# Patient Record
(demographics unavailable — no encounter records)

---

## 2024-12-04 NOTE — ASSESSMENT
[FreeTextEntry1] : #NUBS, R upper chest favor EIC -- excision surgery performed 12/04/2024 -- primary intermediate linear repair performed -- f/u for wound check 2 weeks

## 2024-12-04 NOTE — PHYSICAL EXAM
[Alert] : alert [Oriented x 3] : ~L oriented x 3 [Well Nourished] : well nourished [Conjunctiva Non-injected] : conjunctiva non-injected [No Visual Lymphadenopathy] : no visual  lymphadenopathy [No Clubbing] : no clubbing [No Edema] : no edema [No Bromhidrosis] : no bromhidrosis [No Chromhidrosis] : no chromhidrosis [FreeTextEntry3] : R upper chest with 1.5 cm firm mobile subcu nodule with visible overlying punctum

## 2024-12-04 NOTE — HISTORY OF PRESENT ILLNESS
[FreeTextEntry1] : excision of cyst on R upper chest [de-identified] : Mr. KRYSTLE FAY  is a 51 year old M here for evaluation of below  #cyst on R upper chest x yrs - here for excision today Pertinent positives noted below  History of HIV or hepatitis: No Blood thinners: none Antibiotic Prophylaxis: None  Medical implants: None  The patient's review of systems questionnaire was reviewed. Education needs were identified. There were no barriers to learning.  derm hx:  #dry skin x 3-4yrs - untreated - showers twice daily S dove regular M aloe vera D reg  no personal or family h/o skin cancer Social Hx: works in [employer], visually impaired, has a guidedog

## 2024-12-04 NOTE — CONSULT LETTER
[Dear  ___] : Dear  [unfilled], [Consult Letter:] : I had the pleasure of evaluating your patient, [unfilled]. [Please see my note below.] : Please see my note below. [Consult Closing:] : Thank you very much for allowing me to participate in the care of this patient.  If you have any questions, please do not hesitate to contact me. [Sincerely,] : Sincerely, [FreeTextEntry3] : Olinda Caldwell MD Department of Dermatology [place] and Jayne WADDELL at [facility]

## 2024-12-18 NOTE — HISTORY OF PRESENT ILLNESS
[FreeTextEntry1] : excision of cyst on R upper chest [de-identified] : Mr. KRYSTLE FAY  is a 52 year old M here for evaluation of below  #cyst on R upper chest x yrs - here for wound check s/p  excision 12/4/2024 #itching on R upper back occasionally #spots on body x yrs Pertinent positives noted below  History of HIV or hepatitis: No Blood thinners: none Antibiotic Prophylaxis: None  Medical implants: None  The patient's review of systems questionnaire was reviewed. Education needs were identified. There were no barriers to learning.  derm hx:  #dry skin x 3-4yrs - untreated - showers twice daily S dove regular M aloe vera D reg  no personal or family h/o skin cancer Social Hx: works in [employer], visually impaired, has a guidedog

## 2024-12-18 NOTE — COUNSELING
[de-identified] :    Recommendations to Avoid Gout flare.   Minimize unnecessary surgical procedures and manage trauma effectively to reduce the risk of gout flare-ups. Prevent Volume Depletion: Stay well-hydrated to avoid dehydration, especially during hospitalization or after significant trauma. Limit intake of high-protein foods (e.g., red meat, seafood). Reduce consumption of high-fat foods and sweetened beverages (e.g., those containing fructose). Avoid heavy alcohol use, particularly beer and spirits, which can increase gout risk. Address and control risk factors such as hypertension, obesity, chronic kidney disease, and any conditions requiring organ transplants. Incorporate dairy products into your diet to potentially lower gout risk. Drink coffee if tolerated, as it may be associated with a reduced risk of gout.

## 2024-12-18 NOTE — HISTORY OF PRESENT ILLNESS
[FreeTextEntry1] : f/u [de-identified] : A 51-year-old male with a history of prediabetes is here today for a follow-up on  obesity management The patient has a history of gout. The patient states he is being seen in podiatry and started before on colchicine.  Denies fever.  Palpitation, shortness of breath

## 2024-12-18 NOTE — HISTORY OF PRESENT ILLNESS
[FreeTextEntry1] : excision of cyst on R upper chest [de-identified] : Mr. KRYSTLE FAY  is a 52 year old M here for evaluation of below  #cyst on R upper chest x yrs - here for wound check s/p  excision 12/4/2024 #itching on R upper back occasionally #spots on body x yrs Pertinent positives noted below  History of HIV or hepatitis: No Blood thinners: none Antibiotic Prophylaxis: None  Medical implants: None  The patient's review of systems questionnaire was reviewed. Education needs were identified. There were no barriers to learning.  derm hx:  #dry skin x 3-4yrs - untreated - showers twice daily S dove regular M aloe vera D reg  no personal or family h/o skin cancer Social Hx: works in [employer], visually impaired, has a guidedog

## 2024-12-18 NOTE — ASSESSMENT
[FreeTextEntry1] : Diet Followed at Home: Patient doesn't drink milk, drinks tea    Breakfast: Cereal: Cornflakes, boiled eggs, English Omelet,  and American Bread   Lunch: Chicken Mcconnell or baked chicken, lentil mcconnell w/rice   Dinner: Similar to lunch- veggies (caulilfower, cabbage)   Snacks: Not a . Likes Sugar Free Cocoa w/cinnamon      Recent Weight Change: stable at current weight.  Exercise includes Pushups, situps, stretches. Running in one plate.    Nutrition Related Issues / Problems: Prediabetes Hyperlipidemia    Goals: reviewed therapeutic goals with patient. Diabetes Portion Plate Increase Activity- weight training and 30 min of cardio activity a day.      Nutrition Education / Counseling: The patient was educated on weight loss, fat/cholesterol restricted, portion control and high fiber.    Comprehension: patient asked/verbalized appropriate questions and concerns, patient has good understanding.    Anticipated Compliance: good  Discussed healthy lifestyle, discussed and updated vaccinations, healthy diet, exercise, chk labs, discussed appropriate screening tests including a colonoscopy.  Discussed the importance and benefit of a healthy lifestyle, including a heart-healthy diet such as the Mediterranean diet, regular exercise, and 7 hours of sleep nightly.  Total time 30 min ( 20 min. FTF and 10 min chart review and documentation.)

## 2024-12-18 NOTE — COUNSELING
[de-identified] :    Recommendations to Avoid Gout flare.   Minimize unnecessary surgical procedures and manage trauma effectively to reduce the risk of gout flare-ups. Prevent Volume Depletion: Stay well-hydrated to avoid dehydration, especially during hospitalization or after significant trauma. Limit intake of high-protein foods (e.g., red meat, seafood). Reduce consumption of high-fat foods and sweetened beverages (e.g., those containing fructose). Avoid heavy alcohol use, particularly beer and spirits, which can increase gout risk. Address and control risk factors such as hypertension, obesity, chronic kidney disease, and any conditions requiring organ transplants. Incorporate dairy products into your diet to potentially lower gout risk. Drink coffee if tolerated, as it may be associated with a reduced risk of gout.

## 2024-12-18 NOTE — ASSESSMENT
[FreeTextEntry1] : #epidermoid cyst s/p excision on 12/4 healing well, sutures removed today reviewed diagnosis  #nevi - Counseled about clinically and dermatoscopically benign lesions - No treatment indicated at this time - Counseled patient to notify us of any changes  #localized pruritus, notalgia paresthetica start OTC sarna, store in fridge for added cooling effect, apply as needed  - TBSE performed today - Advised sun protection, broad spectrum SPF 30 or higher daily and reapply often, sun protective clothing - Counseled patient to monitor for changes - rtc q1yr for repeat skin exam or sooner if new/concerning lesion

## 2024-12-18 NOTE — HISTORY OF PRESENT ILLNESS
[FreeTextEntry1] : f/u [de-identified] : A 51-year-old male with a history of prediabetes is here today for a follow-up on  obesity management The patient has a history of gout. The patient states he is being seen in podiatry and started before on colchicine.  Denies fever.  Palpitation, shortness of breath

## 2024-12-18 NOTE — PHYSICAL EXAM
[Alert] : alert [Oriented x 3] : ~L oriented x 3 [Well Nourished] : well nourished [Conjunctiva Non-injected] : conjunctiva non-injected [No Visual Lymphadenopathy] : no visual  lymphadenopathy [No Clubbing] : no clubbing [No Edema] : no edema [No Bromhidrosis] : no bromhidrosis [No Chromhidrosis] : no chromhidrosis [FreeTextEntry3] : The patient is well-appearing, in no acute distress, alert and oriented x 3. Mood and affect are normal. A complete cutaneous examination of the scalp, face, neck, chest, abdomen, back, bilateral arms, bilateral legs, finger digits, nails, eyelids, conjunctiva and lips reveals the following significant findings:  few nevi scattered on truk, arms back clear with no rash  R upper chest with linear pink scar , healing well, sutures in place

## 2024-12-24 NOTE — PHYSICAL EXAM
[No Acute Distress] : no acute distress [Well Nourished] : well nourished [Well Developed] : well developed [Well-Appearing] : well-appearing [Normal Outer Ear/Nose] : the outer ears and nose were normal in appearance [Normal Oropharynx] : the oropharynx was normal [No JVD] : no jugular venous distention [No Lymphadenopathy] : no lymphadenopathy [Supple] : supple [Thyroid Normal, No Nodules] : the thyroid was normal and there were no nodules present [No Respiratory Distress] : no respiratory distress  [No Accessory Muscle Use] : no accessory muscle use [Clear to Auscultation] : lungs were clear to auscultation bilaterally [Normal Rate] : normal rate  [Regular Rhythm] : with a regular rhythm [Normal S1, S2] : normal S1 and S2 [No Murmur] : no murmur heard [No Carotid Bruits] : no carotid bruits [No Abdominal Bruit] : a ~M bruit was not heard ~T in the abdomen [No Varicosities] : no varicosities [Pedal Pulses Present] : the pedal pulses are present [No Edema] : there was no peripheral edema [No Palpable Aorta] : no palpable aorta [No Extremity Clubbing/Cyanosis] : no extremity clubbing/cyanosis [Soft] : abdomen soft [Non Tender] : non-tender [Non-distended] : non-distended [No Masses] : no abdominal mass palpated [No HSM] : no HSM [Normal Bowel Sounds] : normal bowel sounds [Normal Posterior Cervical Nodes] : no posterior cervical lymphadenopathy [Normal Anterior Cervical Nodes] : no anterior cervical lymphadenopathy [No CVA Tenderness] : no CVA  tenderness [No Spinal Tenderness] : no spinal tenderness [No Joint Swelling] : no joint swelling [Grossly Normal Strength/Tone] : grossly normal strength/tone [No Rash] : no rash [Coordination Grossly Intact] : coordination grossly intact [No Focal Deficits] : no focal deficits [Normal Gait] : normal gait [Deep Tendon Reflexes (DTR)] : deep tendon reflexes were 2+ and symmetric [Normal Affect] : the affect was normal [Normal Insight/Judgement] : insight and judgment were intact [de-identified] : corneal scarring, blind [de-identified] : neg

## 2024-12-24 NOTE — HISTORY OF PRESENT ILLNESS
[FreeTextEntry1] : selma [de-identified] : 51 y/o Male here today for Annual. Patient has no other medical complaints. Doing well. Appetite, sleep,bms,voiding, mood all ok.  Interim and relevant labs, imaging and consultations reviewed.  referred colonoscopy given. schedule for 1/8/24.  Refused Shingrix vaccine.

## 2024-12-24 NOTE — COUNSELING
[Fall prevention counseling provided] : Fall prevention counseling provided [Adequate lighting] : Adequate lighting [No throw rugs] : No throw rugs [Use proper foot wear] : Use proper foot wear [Use recommended devices] : Use recommended devices [Behavioral health counseling provided] : Behavioral health counseling provided [Sleep ___ hours/day] : Sleep [unfilled] hours/day [Engage in a relaxing activity] : Engage in a relaxing activity [Plan in advance] : Plan in advance [Potential consequences of obesity discussed] : Potential consequences of obesity discussed [Benefits of weight loss discussed] : Benefits of weight loss discussed [Structured Weight Management Program suggested:] : Structured weight management program suggested [Encouraged to maintain food diary] : Encouraged to maintain food diary [Encouraged to increase physical activity] : Encouraged to increase physical activity [Encouraged to use exercise tracking device] : Encouraged to use exercise tracking device [Target Wt Loss Goal ___] : Weight Loss Goals: Target weight loss goal [unfilled] lbs [Weigh Self Weekly] : weigh self weekly [Decrease Portions] : decrease portions [____ min/wk Activity] : [unfilled] min/wk activity [Keep Food Diary] : keep food diary [None] : None [Good understanding] : Patient has a good understanding of lifestyle changes and steps needed to achieve self management goal [FreeTextEntry4] : 15 [de-identified] : Total face-to-face time with patient - 30 minutes; >50% involved counselling, review of labs/tests, and/or coordination of medical care: Medical Annual wellness visit completed: HRA completed and reviewed with patient Medical, family, surgical history reviewed with patient and updated List of current providers r/w patient and updated Vitals, BMI reviewed and discussed along with healthy BMI goals. Dietary counseling x 15 minutes provided Depression PHQ 9 completed and reviewed  Annual safety assessment reviewed discussed advanced directives smoking cessation counseling provided Established routine screening and immunization schedules VACCINATION & OTHER TX RECOMMENDATIONS  ASA preventative therapy Calcium/Vitamin D supplementation   Dietary counseling, nutrition referral risks vs. benefits d/w patient. routine vaccination and vaccination schedules and recommendation d/w patient  Vaccines recommended:  * pneumovax (once after 65) & prevnar * annual Influenza vaccine * Hep B vaccines * zostavax * Tdap  Colorectal screening recommended; screening colonoscopy q10yr, flex sig q5yr, annual fecal occult testing BMD recommended biennially for osteoporosis screening Glaucoma screening recommended, annual optho evals Cardiovascular screening and blood tests recommended and discussed w/ patient, cholesterol screening and dietary counseling AAA recommended x 1  Met with TAM PIZANO, who was willing to discuss advance care planning.  Our advance care planning conversation included a discussion about: 1. The value and importance of advance care planning. 2. Experiences with loved ones who have been seriously ill or have . 3. Exploration of personal, cultural, or spiritual beliefs that might influence medical decisions. 4. Exploration of goals of care in the event of a sudden injury or illness. 5. Identification of a health care agent. 6. Review and update, or completion of, an advance directive. Start time: 1030 End time: 1045  diet and exercise weight loss.  Low-salt low-fat ADA diet/ htn- Discussed diabetes physiology - Discussed importance of monitoring blood glucose levels - Encouraged a low fat/low cholesterol diet - Discussed symptoms of hyperglycemia and hypoglycemia - Discussed ADA glucose goals - Discussed  HGB A1c and the effects of blood glucose on the level - Discussed Healthy eating, avoidance of concentrated sweets, and to include vegetables by at least 2 meals a day - Discussed regular exercise - Discussed importance of follow up physician visits Limit intake of Sodium (Salt) to less than 2 grams a day to prevent fluid retention-swelling or worsening of symptoms. The importance of keeping the blood pressure at or below 130/80 to prevent stroke, heart attacks, kidney failure, blindness, and loss of limbs was  low chol diet. Avoid fried foods, red meat, butter, eggs, hard cheeses. Use canola or olive oil preferred. ::  was established in which goals would be set, monitoring would be done, and problem solving would also be addressed. The patient would be assisted using behavior change techniques, such as self-help and counseling through behavioral modification: Problem solving using hypnosis and positive medical reinforcement to achieve agreed-upon goals. Symptomatic patients : Test for influenza, if positive, treat for influenza and do not continue below.  1. Fever plus cough or shortness of breath : Test for RVP and COVID-19. 2.Indirect, circumstantial or unclear exposure to COVID-19, or other concerning cases not meeting above criteria: Please call [facility] to discuss testing.   +++ All above cases must be reported to the [facility] registry. +++  Asymptomatic patients:  1. Known first-degree direct-contact exposure to positive COVID-19 patient but asymptomatic: No testing PLUS 14 day self-quarantine. Pt to call if symptoms develop. Report to [facility] Registry. 2. No known exposure and asymptomatic, referred from outside healthcare organization: Please call [facility] to discuss testing.  3.All other asymptomatic patients with no known exposures: no testing, no exceptions.

## 2024-12-24 NOTE — HEALTH RISK ASSESSMENT
[Good] : ~his/her~  mood as  good [No] : In the past 12 months have you used drugs other than those required for medical reasons? No [No falls in past year] : Patient reported no falls in the past year [0] : 2) Feeling down, depressed, or hopeless: Not at all (0) [PHQ-2 Negative - No further assessment needed] : PHQ-2 Negative - No further assessment needed [Never] : Never [None] : None [With Family] : lives with family [Employed] : employed [] :  [Fully functional (bathing, dressing, toileting, transferring, walking, feeding)] : Fully functional (bathing, dressing, toileting, transferring, walking, feeding) [Fully functional (using the telephone, shopping, preparing meals, housekeeping, doing laundry, using] : Fully functional and needs no help or supervision to perform IADLs (using the telephone, shopping, preparing meals, housekeeping, doing laundry, using transportation, managing medications and managing finances) [Time Spent: ___ Minutes] : I spent [unfilled] minutes performing a depression screening for this patient. [HIV test declined] : HIV test declined [Hepatitis C test declined] : Hepatitis C test declined [Smoke Detector] : smoke detector [Safety elements used in home] : safety elements used in home [Seat Belt] :  uses seat belt [With Patient/Caregiver] : , with patient/caregiver [Designated Healthcare Proxy] : Designated healthcare proxy [Name: ___] : Health Care Proxy's Name: [unfilled]  [Relationship: ___] : Relationship: [unfilled] [Aggressive treatment] : aggressive treatment [I will adhere to the patient's wishes.] : I will adhere to the patient's wishes. [Time Spent: ___ minutes] : Time Spent: [unfilled] minutes [Audit-CScore] : 0 [de-identified] : walks [de-identified] : healthy [[id number]] : 0 [Change in mental status noted] : No change in mental status noted [Language] : denies difficulty with language [Behavior] : denies difficulty with behavior [Handling Complex Tasks] : denies difficulty handling complex tasks [Reasoning] : denies difficulty with reasoning [Reports changes in hearing] : Reports no changes in hearing [Reports changes in dental health] : Reports no changes in dental health [Travel to Developing Areas] : does not  travel to developing areas [TB Exposure] : is not being exposed to tuberculosis [ColonoscopyComments] : scheduled for 1/2025 [de-identified] : blind [AdvancecareDate] : 12/24 [FreeTextEntry4] : [id number]   Met with KRYSTLE FAY, who was willing to discuss advance care planning.  Our advance care planning conversation included a discussion about:  1. The value and importance of advance care planning.  2. Experiences with loved ones who have been seriously ill or have .  3. Exploration of personal, cultural, or spiritual beliefs that might influence medical decisions.  4. Exploration of goals of care in the event of a sudden injury or illness.  5. Identification of a health care agent.  6. Review and update, or completion of, an advance directive.  Start time: ____________                    End time: ____________

## 2024-12-24 NOTE — ASSESSMENT
[FreeTextEntry1] : Prediabetes- d/e, chk labs Blind- stable.  Gout- off meds, watch diet, fluids, chk uric acid healthy lifestyle, d/e, chk labs, reviewed immunizations and prev. health measures, chk psa, rec. colonoscopy Discussed the importance of screening for colon cancer. Reviewed screening reccomendations including colonoscopy, Cologuard and Fit DNA testing. I strongly encouraged colonoscopy as that is the best screening test to detect both colon cancer and polyps and is the gold standard. Discussed the importance and benefit of a healthy lifestyle including a heart healthy diet such as the Mediterranean diet and regular exercise as well as 7 hours of sleep nightly.  Obesity Set Calorie Goal: Establish a daily calorie limit tailored to achieve a gradual weight loss of 1-2 pounds per week. Prioritize Balanced Meals: Include vegetables (50%), lean protein (25%), and whole grains (25%) in every meal. Increase Fiber Intake: To improve satiety, aim for 25-30g of fiber daily from sources like fruits, vegetables, and whole grains. Hydration First: Drink at least 2-3 liters of water daily, especially before meals, to manage hunger. Limit Added Sugars: Reduce intake of sugary foods and beverages to less than 5% of daily calories. Meal Prep and Planning: Prepare meals ahead of time to avoid reliance on processed or fast foods. Mindful Eating Practices: Focus on eating without distractions and recognize satiety cues to prevent overeating. Include Daily Activity: Engage in at least 30 minutes of moderate exercise (e.g., brisk walking, yoga) 5 days a week. Monitor Progress: Keep a food and activity journal or use a tracking alton to stay accountable and identify patterns. Regular Check-Ins: Schedule monthly weigh-ins and consultations with a dietitian or healthcare provider to adjust the plan as needed.  Time spent 45 minutes including charting, physical exam, chart review, counseling on weight loss and prediabetes

## 2025-01-08 NOTE — REVIEW OF SYSTEMS
[Feeling Poorly] : not feeling poorly [Feeling Tired] : not feeling tired [Abdominal Pain] : no abdominal pain [Constipation] : no constipation [Diarrhea] : no diarrhea [Bleeding] : no bleeding

## 2025-01-08 NOTE — HISTORY OF PRESENT ILLNESS
[FreeTextEntry1] : Ref MD: Mg  52M for index screening colonoscopy.   Normal daily bm without issue. No known BRBPR. No abdominal pain.  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

## 2025-07-03 NOTE — HISTORY OF PRESENT ILLNESS
[Sneakers] : guerda [FreeTextEntry1] : Patient presents with multiple problems. Complains of right sub hallux bump that started a few years ago as a blister when he used to walk a lot. He has a similar bump over the lateral heel, enlarging over the years, with pain. Left heel has med/ lat bumps with pain. Has a hx of taking colchicine for acute gout of 1st MPJ's in the past, and only takes it as needed if he gets a gout attack; last acute attack 2 yrs. Aso has left posterior achilles pain.

## 2025-07-03 NOTE — PHYSICAL EXAM
[General Appearance - Alert] : alert [General Appearance - In No Acute Distress] : in no acute distress [2+] : left foot dorsalis pedis 2+ [No Joint Swelling] : no joint swelling [Sensation] : the sensory exam was normal to light touch and pinprick [Ankle Swelling (On Exam)] : not present [Varicose Veins Of Lower Extremities] : not present [] : not present [Delayed in the Right Toes] : capillary refills normal in right toes [Delayed in the Left Toes] : capillary refills normal in the left toes [de-identified] : pain on palpation of soft tissue prominences.  Dec 1st MPJ ROM with no crepitus. Normal AJ ROM BL, Dec STJ and MTJ ROM without pain BL. [FreeTextEntry1] : Right plantar hallux, right lateral heel, left med and lat heel tophi present with discomfort on palpation. No open lesions or drainage.

## 2025-07-03 NOTE — PHYSICAL EXAM
[General Appearance - Alert] : alert [General Appearance - In No Acute Distress] : in no acute distress [2+] : left foot dorsalis pedis 2+ [No Joint Swelling] : no joint swelling [Sensation] : the sensory exam was normal to light touch and pinprick [Ankle Swelling (On Exam)] : not present [Varicose Veins Of Lower Extremities] : not present [] : not present [Delayed in the Right Toes] : capillary refills normal in right toes [Delayed in the Left Toes] : capillary refills normal in the left toes [de-identified] : pain on palpation of soft tissue prominences.  Dec 1st MPJ ROM with no crepitus. Normal AJ ROM BL, Dec STJ and MTJ ROM without pain BL. [FreeTextEntry1] : Right plantar hallux, right lateral heel, left med and lat heel tophi present with discomfort on palpation. No open lesions or drainage.

## 2025-07-03 NOTE — PHYSICAL EXAM
[General Appearance - Alert] : alert [General Appearance - In No Acute Distress] : in no acute distress [2+] : left foot dorsalis pedis 2+ [No Joint Swelling] : no joint swelling [Sensation] : the sensory exam was normal to light touch and pinprick [Ankle Swelling (On Exam)] : not present [Varicose Veins Of Lower Extremities] : not present [] : not present [Delayed in the Right Toes] : capillary refills normal in right toes [Delayed in the Left Toes] : capillary refills normal in the left toes [de-identified] : pain on palpation of soft tissue prominences.  Dec 1st MPJ ROM with no crepitus. Normal AJ ROM BL, Dec STJ and MTJ ROM without pain BL. [FreeTextEntry1] : Right plantar hallux, right lateral heel, left med and lat heel tophi present with discomfort on palpation. No open lesions or drainage.

## 2025-07-03 NOTE — PHYSICAL EXAM
[General Appearance - Alert] : alert [General Appearance - In No Acute Distress] : in no acute distress [2+] : left foot dorsalis pedis 2+ [No Joint Swelling] : no joint swelling [Sensation] : the sensory exam was normal to light touch and pinprick [Ankle Swelling (On Exam)] : not present [Varicose Veins Of Lower Extremities] : not present [] : not present [Delayed in the Right Toes] : capillary refills normal in right toes [Delayed in the Left Toes] : capillary refills normal in the left toes [de-identified] : pain on palpation of soft tissue prominences.  Dec 1st MPJ ROM with no crepitus. Normal AJ ROM BL, Dec STJ and MTJ ROM without pain BL. [FreeTextEntry1] : Right plantar hallux, right lateral heel, left med and lat heel tophi present with discomfort on palpation. No open lesions or drainage.

## 2025-07-03 NOTE — ASSESSMENT
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 Referred to Rheum for Krystexxa to dissolve tophi. Discussed surgical excision if medical treatment is unsuccessful. Discussed gout diet. Discussed to monitor for any skin breakage as cellulitis can occur.  Hallux limitus- in part due to OA, in part due to gout erosions of the joint. Advised to wear sneakers, avoid end ROM activities. Possible steroid injection and cheilectomy in the future if becomes consistently painful. RTC prn

## 2025-07-03 NOTE — PROCEDURE
[FreeTextEntry1] : BL feet 3 views, ankle 2 views weight bearing were taken to evaluate for underlying exostoses No acute fracture/ dislocation BL. Ankle mortise even and congruent/ no OCD/ narrowing BL. No calc spurs BL. Flatfoot deformity BL. Bl 1st MPJ uneven joint narrowing with small erosions, R dorsal 1st met spur.   Last Uric acid 12/24 is 9. GFR Cr normal.

## 2025-07-14 NOTE — PHYSICAL EXAM
[General Appearance - Alert] : alert [General Appearance - In No Acute Distress] : in no acute distress [General Appearance - Well Nourished] : well nourished [General Appearance - Well-Appearing] : healthy appearing [Outer Ear] : the ears and nose were normal in appearance [Neck Appearance] : the appearance of the neck was normal [Neck Cervical Mass (___cm)] : no neck mass was observed [Respiration, Rhythm And Depth] : normal respiratory rhythm and effort [Auscultation Breath Sounds / Voice Sounds] : lungs were clear to auscultation bilaterally [Heart Rate And Rhythm] : heart rate was normal and rhythm regular [Bowel Sounds] : normal bowel sounds [Abdomen Soft] : soft [No Spinal Tenderness] : no spinal tenderness [] : no rash [FreeTextEntry1] : + tophi as per above

## 2025-07-14 NOTE — DATA REVIEWED
[FreeTextEntry1] : 12/2024 labs normal CBC, ESR is 15, normal B12, normal TSH, vitamin D is 23.5, triglycerides 279, CMP has creatinine of 1.02, uric acid 9.0, ALT is slightly elevated 46 with normal being 45 with normal AST.  Per podiatry report from June 2025 patient has bilateral first MTP TP erosions of joint with soft tissue consistent with tophus consistent with gouty arthritis.  Tophi seen on the heels bilaterally as well.

## 2025-07-14 NOTE — REVIEW OF SYSTEMS
[Dry Eyes] : dryness of the eyes [As Noted in HPI] : as noted in HPI [Fever] : no fever [Chills] : no chills [Chest Pain] : no chest pain [Shortness Of Breath] : no shortness of breath [Skin Lesions] : no skin lesions [Limb Weakness] : no limb weakness

## 2025-07-14 NOTE — HISTORY OF PRESENT ILLNESS
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  The patient had gout first about 4 years ago.  has had about 2 major flares in 5 years.  usually in great toe.  swelling of ankle right - x 3 days and resolved in 3 days.  felt like his gout.  but thinks it was due to doing exercises - squatting.  The patient had gouty tophi notable and the podiatry sees xrays with erosions.  pain in feet with walking more.  h/o of walking for 2 miles without issues.   with sitting no pain.  more with walking stairs.  podiatry wants Krystexxa.  the patient notes pain in the feet with walking.  knees with stairs.  difficult to walk without shoes.  some discomfort.  no renal stones.

## 2025-07-14 NOTE — ASSESSMENT
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  For the gouty tophi are painful with walking as they are on bilateral heels.  Likewise hyperuricemia is an independent risk factor for cardiovascular disease.  Would:  - Check uric acid, CMP, inflammatory markers today to find out baseline uric acid - check allopurinol hypersensitivity testing [id number] to make sure negative before starting medication.    - start colchicine 0.6mg daily on July 20th - for at least 2 weeks - start allopurinol 300mg daily on July 27th indefinitely  - goal uric acid is less than 6mg/dL and will need 300mg daily for us to reach this goal.   - podiatrist to please provide film for rheumatology to review foot radiographs  return in 2 months to recheck uric acid on allopurinol.  information given on gout and allopurinol.

## 2025-07-20 NOTE — ASSESSMENT
[FreeTextEntry1] : 51 yo M with phimosis and vasectomy counseling  - Reviewed options for his mild phimosis - steroid cream vs dorsal slit vs. circumcision. Pt actually hoping to keep foreskin and just release the band. Discussed how a dorsal slit would not achieve this either - Reviewed that vasectomy is permanent sterilization procedure. R/b/a discussed,  -d/w pt that he cannot engage in unprotected intercourse without risk of pregnancy until confirmation of sterility with semen analysis, likely will require 15-20 ejaculations and/or >3 months -d/w he cannot engage in sexual activity for 1-2 wks after vasectomy  - Pt will call and let us know if he would like to proceed with procedure   The time spent is inclusive of the time it took to see, evaluate, and manage the patient. Time is inclusive of the time taken to review chart, reconcile medications, document findings, and communicate with other providers (when applicable).

## 2025-07-20 NOTE — HISTORY OF PRESENT ILLNESS
[FreeTextEntry1] : 53 yo blind M presents with phimosis for 5 years Able to retract but sometimes uncomfortable with erection Also interested in vasectomy 3 children - 23, 19, 17 years old Denies any urinary issues

## 2025-07-20 NOTE — PHYSICAL EXAM
[Normal Appearance] : normal appearance [Well Groomed] : well groomed [General Appearance - In No Acute Distress] : no acute distress [Edema] : no peripheral edema [Respiration, Rhythm And Depth] : normal respiratory rhythm and effort [Exaggerated Use Of Accessory Muscles For Inspiration] : no accessory muscle use [Abdomen Soft] : soft [Abdomen Tenderness] : non-tender [Costovertebral Angle Tenderness] : no ~M costovertebral angle tenderness [Urinary Bladder Findings] : the bladder was normal on palpation [Normal Station and Gait] : the gait and station were normal for the patient's age [] : no rash [No Focal Deficits] : no focal deficits [Oriented To Time, Place, And Person] : oriented to person, place, and time [Affect] : the affect was normal [Mood] : the mood was normal [No Palpable Adenopathy] : no palpable adenopathy [Chaperone Present] : A chaperone was present in the examining room during all aspects of the physical examination [de-identified] : slight phimosis, palpable bilateral vas, slightly difficult to palpate on the right side [FreeTextEntry2] : SANDHYA Dickson